# Patient Record
Sex: MALE
[De-identification: names, ages, dates, MRNs, and addresses within clinical notes are randomized per-mention and may not be internally consistent; named-entity substitution may affect disease eponyms.]

---

## 2023-06-02 ENCOUNTER — NURSE TRIAGE (OUTPATIENT)
Dept: OTHER | Facility: CLINIC | Age: 73
End: 2023-06-02

## 2023-06-02 NOTE — TELEPHONE ENCOUNTER
Location of patient: [place]    Subjective: Caller states \"Medication question\"     Current Symptoms:   Wanting to know if he can take Claritin from   Itching all over at night     Onset:   Last night     Pain Severity:   No pain     Temperature:    None     What has been tried:   None     Recommended disposition:   3 days - consult a pharmacist regarding  medicaiton    Care advice provided, patient verbalizes understanding; denies any other questions or concerns; instructed to call back for any new or worsening symptoms. This triage is a result of a call to [address] [place] [address]. Please do not respond to the triage nurse through this encounter. Any subsequent communication should be directly with the patient.     Reason for Disposition   Widespread itching and cause unknown and present > 48 hours    Protocols used: Itching - Widespread-ADULT-[place]